# Patient Record
Sex: MALE | Race: WHITE | NOT HISPANIC OR LATINO | Employment: OTHER | URBAN - METROPOLITAN AREA
[De-identification: names, ages, dates, MRNs, and addresses within clinical notes are randomized per-mention and may not be internally consistent; named-entity substitution may affect disease eponyms.]

---

## 2023-04-18 DIAGNOSIS — R13.12 OROPHARYNGEAL DYSPHAGIA: Primary | ICD-10-CM

## 2023-04-18 NOTE — PROGRESS NOTES
Subjective   Arsen Mtz is a 87 y.o. male who presents for No chief complaint on file..  HPI    Objective   There were no vitals taken for this visit.   Physical Exam    Assessment/Plan   Problem List Items Addressed This Visit    None           Phillip Barone MD

## 2023-05-02 DIAGNOSIS — E78.5 DYSLIPIDEMIA: Primary | ICD-10-CM

## 2023-05-02 DIAGNOSIS — I10 PRIMARY HYPERTENSION: Primary | ICD-10-CM

## 2023-05-02 DIAGNOSIS — E03.9 HYPOTHYROIDISM, UNSPECIFIED TYPE: ICD-10-CM

## 2023-05-02 RX ORDER — LOSARTAN POTASSIUM AND HYDROCHLOROTHIAZIDE 25; 100 MG/1; MG/1
TABLET ORAL
Qty: 90 TABLET | Refills: 3 | Status: SHIPPED | OUTPATIENT
Start: 2023-05-02

## 2023-05-04 RX ORDER — LEVOTHYROXINE SODIUM 88 UG/1
TABLET ORAL
Qty: 90 TABLET | Refills: 3 | Status: SHIPPED | OUTPATIENT
Start: 2023-05-04 | End: 2023-05-19 | Stop reason: SDUPTHER

## 2023-05-04 RX ORDER — ATORVASTATIN CALCIUM 20 MG/1
TABLET, FILM COATED ORAL
Qty: 90 TABLET | Refills: 3 | Status: SHIPPED | OUTPATIENT
Start: 2023-05-04 | End: 2024-05-30 | Stop reason: SDUPTHER

## 2023-05-19 DIAGNOSIS — E03.9 HYPOTHYROIDISM, UNSPECIFIED TYPE: ICD-10-CM

## 2023-05-19 RX ORDER — LEVOTHYROXINE SODIUM 88 UG/1
88 TABLET ORAL DAILY
Qty: 90 TABLET | Refills: 3 | Status: SHIPPED | OUTPATIENT
Start: 2023-05-19 | End: 2024-02-05 | Stop reason: SDUPTHER

## 2023-06-12 ENCOUNTER — OFFICE VISIT (OUTPATIENT)
Dept: PRIMARY CARE | Facility: CLINIC | Age: 88
End: 2023-06-12
Payer: MEDICARE

## 2023-06-12 VITALS
DIASTOLIC BLOOD PRESSURE: 60 MMHG | SYSTOLIC BLOOD PRESSURE: 110 MMHG | TEMPERATURE: 97.1 F | OXYGEN SATURATION: 98 % | HEART RATE: 50 BPM | WEIGHT: 128 LBS | HEIGHT: 66 IN | BODY MASS INDEX: 20.57 KG/M2

## 2023-06-12 DIAGNOSIS — F02.B0 MODERATE LATE ONSET ALZHEIMER'S DEMENTIA WITHOUT BEHAVIORAL DISTURBANCE, PSYCHOTIC DISTURBANCE, MOOD DISTURBANCE, OR ANXIETY (MULTI): ICD-10-CM

## 2023-06-12 DIAGNOSIS — R13.12 OROPHARYNGEAL DYSPHAGIA: ICD-10-CM

## 2023-06-12 DIAGNOSIS — R54 FRAIL ELDERLY: ICD-10-CM

## 2023-06-12 DIAGNOSIS — E03.9 HYPOTHYROIDISM, UNSPECIFIED TYPE: ICD-10-CM

## 2023-06-12 DIAGNOSIS — E55.9 VITAMIN D DEFICIENCY: ICD-10-CM

## 2023-06-12 DIAGNOSIS — G30.1 MODERATE LATE ONSET ALZHEIMER'S DEMENTIA WITHOUT BEHAVIORAL DISTURBANCE, PSYCHOTIC DISTURBANCE, MOOD DISTURBANCE, OR ANXIETY (MULTI): ICD-10-CM

## 2023-06-12 DIAGNOSIS — I10 ESSENTIAL HYPERTENSION, BENIGN: ICD-10-CM

## 2023-06-12 DIAGNOSIS — Z00.00 ROUTINE GENERAL MEDICAL EXAMINATION AT HEALTH CARE FACILITY: Primary | ICD-10-CM

## 2023-06-12 PROBLEM — F03.90 DEMENTIA (MULTI): Status: ACTIVE | Noted: 2023-06-12

## 2023-06-12 PROBLEM — H91.90 HEARING LOSS: Status: ACTIVE | Noted: 2023-06-12

## 2023-06-12 PROBLEM — U07.1 COVID-19: Status: RESOLVED | Noted: 2023-06-12 | Resolved: 2023-06-12

## 2023-06-12 PROBLEM — J45.909 REACTIVE AIRWAY DISEASE (HHS-HCC): Status: ACTIVE | Noted: 2023-06-12

## 2023-06-12 PROBLEM — G31.84 AMNESTIC MCI (MILD COGNITIVE IMPAIRMENT WITH MEMORY LOSS): Status: RESOLVED | Noted: 2023-06-12 | Resolved: 2023-06-12

## 2023-06-12 PROBLEM — R26.81 UNSTABLE GAIT: Status: ACTIVE | Noted: 2023-06-12

## 2023-06-12 LAB
ALANINE AMINOTRANSFERASE (SGPT) (U/L) IN SER/PLAS: 7 U/L (ref 10–52)
ALBUMIN (G/DL) IN SER/PLAS: 4.5 G/DL (ref 3.4–5)
ALKALINE PHOSPHATASE (U/L) IN SER/PLAS: 44 U/L (ref 33–136)
ANION GAP IN SER/PLAS: 14 MMOL/L (ref 10–20)
ASPARTATE AMINOTRANSFERASE (SGOT) (U/L) IN SER/PLAS: 16 U/L (ref 9–39)
BILIRUBIN TOTAL (MG/DL) IN SER/PLAS: 0.6 MG/DL (ref 0–1.2)
CALCIDIOL (25 OH VITAMIN D3) (NG/ML) IN SER/PLAS: 55 NG/ML
CALCIUM (MG/DL) IN SER/PLAS: 10.3 MG/DL (ref 8.6–10.6)
CARBON DIOXIDE, TOTAL (MMOL/L) IN SER/PLAS: 31 MMOL/L (ref 21–32)
CHLORIDE (MMOL/L) IN SER/PLAS: 101 MMOL/L (ref 98–107)
CHOLESTEROL (MG/DL) IN SER/PLAS: 138 MG/DL (ref 0–199)
CHOLESTEROL IN HDL (MG/DL) IN SER/PLAS: 58.9 MG/DL
CHOLESTEROL/HDL RATIO: 2.3
COBALAMIN (VITAMIN B12) (PG/ML) IN SER/PLAS: 322 PG/ML (ref 211–911)
CREATININE (MG/DL) IN SER/PLAS: 1.09 MG/DL (ref 0.5–1.3)
ERYTHROCYTE DISTRIBUTION WIDTH (RATIO) BY AUTOMATED COUNT: 13.3 % (ref 11.5–14.5)
ERYTHROCYTE MEAN CORPUSCULAR HEMOGLOBIN CONCENTRATION (G/DL) BY AUTOMATED: 32.5 G/DL (ref 32–36)
ERYTHROCYTE MEAN CORPUSCULAR VOLUME (FL) BY AUTOMATED COUNT: 99 FL (ref 80–100)
ERYTHROCYTES (10*6/UL) IN BLOOD BY AUTOMATED COUNT: 4 X10E12/L (ref 4.5–5.9)
GFR MALE: 65 ML/MIN/1.73M2
GLUCOSE (MG/DL) IN SER/PLAS: 89 MG/DL (ref 74–99)
HEMATOCRIT (%) IN BLOOD BY AUTOMATED COUNT: 39.7 % (ref 41–52)
HEMOGLOBIN (G/DL) IN BLOOD: 12.9 G/DL (ref 13.5–17.5)
LDL: 69 MG/DL (ref 0–99)
LEUKOCYTES (10*3/UL) IN BLOOD BY AUTOMATED COUNT: 6.7 X10E9/L (ref 4.4–11.3)
NRBC (PER 100 WBCS) BY AUTOMATED COUNT: 0 /100 WBC (ref 0–0)
PLATELETS (10*3/UL) IN BLOOD AUTOMATED COUNT: 206 X10E9/L (ref 150–450)
POTASSIUM (MMOL/L) IN SER/PLAS: 4.7 MMOL/L (ref 3.5–5.3)
PROTEIN TOTAL: 7.4 G/DL (ref 6.4–8.2)
SODIUM (MMOL/L) IN SER/PLAS: 141 MMOL/L (ref 136–145)
THYROTROPIN (MIU/L) IN SER/PLAS BY DETECTION LIMIT <= 0.05 MIU/L: 5.86 MIU/L (ref 0.44–3.98)
THYROXINE (T4) FREE (NG/DL) IN SER/PLAS: 1.14 NG/DL (ref 0.78–1.48)
TRIGLYCERIDE (MG/DL) IN SER/PLAS: 51 MG/DL (ref 0–149)
UREA NITROGEN (MG/DL) IN SER/PLAS: 20 MG/DL (ref 6–23)
VLDL: 10 MG/DL (ref 0–40)

## 2023-06-12 PROCEDURE — G0439 PPPS, SUBSEQ VISIT: HCPCS | Performed by: INTERNAL MEDICINE

## 2023-06-12 PROCEDURE — 99397 PER PM REEVAL EST PAT 65+ YR: CPT | Performed by: INTERNAL MEDICINE

## 2023-06-12 PROCEDURE — 82607 VITAMIN B-12: CPT

## 2023-06-12 PROCEDURE — 85027 COMPLETE CBC AUTOMATED: CPT

## 2023-06-12 PROCEDURE — 1160F RVW MEDS BY RX/DR IN RCRD: CPT | Performed by: INTERNAL MEDICINE

## 2023-06-12 PROCEDURE — 1159F MED LIST DOCD IN RCRD: CPT | Performed by: INTERNAL MEDICINE

## 2023-06-12 PROCEDURE — 80061 LIPID PANEL: CPT

## 2023-06-12 PROCEDURE — 1170F FXNL STATUS ASSESSED: CPT | Performed by: INTERNAL MEDICINE

## 2023-06-12 PROCEDURE — 84443 ASSAY THYROID STIM HORMONE: CPT

## 2023-06-12 PROCEDURE — 84439 ASSAY OF FREE THYROXINE: CPT

## 2023-06-12 PROCEDURE — 82306 VITAMIN D 25 HYDROXY: CPT

## 2023-06-12 PROCEDURE — 1036F TOBACCO NON-USER: CPT | Performed by: INTERNAL MEDICINE

## 2023-06-12 PROCEDURE — 3078F DIAST BP <80 MM HG: CPT | Performed by: INTERNAL MEDICINE

## 2023-06-12 PROCEDURE — 99214 OFFICE O/P EST MOD 30 MIN: CPT | Performed by: INTERNAL MEDICINE

## 2023-06-12 PROCEDURE — G0444 DEPRESSION SCREEN ANNUAL: HCPCS | Performed by: INTERNAL MEDICINE

## 2023-06-12 PROCEDURE — 3074F SYST BP LT 130 MM HG: CPT | Performed by: INTERNAL MEDICINE

## 2023-06-12 PROCEDURE — 80053 COMPREHEN METABOLIC PANEL: CPT

## 2023-06-12 RX ORDER — MV-MN/C/THEANINE/HERB NO.310 1000-200MG
2 POWDER IN PACKET (EA) ORAL DAILY
COMMUNITY
Start: 2021-05-19

## 2023-06-12 RX ORDER — NAPROXEN SODIUM 220 MG/1
1 TABLET, FILM COATED ORAL DAILY
COMMUNITY
Start: 2021-05-25

## 2023-06-12 RX ORDER — ACETAMINOPHEN 500 MG
TABLET ORAL
COMMUNITY

## 2023-06-12 RX ORDER — DOCUSATE SODIUM 100 MG/1
100 CAPSULE, LIQUID FILLED ORAL DAILY
COMMUNITY
Start: 2019-05-23

## 2023-06-12 RX ORDER — ALBUTEROL SULFATE 90 UG/1
2 AEROSOL, METERED RESPIRATORY (INHALATION) EVERY 4 HOURS PRN
COMMUNITY
Start: 2019-03-12

## 2023-06-12 ASSESSMENT — ENCOUNTER SYMPTOMS
DEPRESSION: 0
LOSS OF SENSATION IN FEET: 0
OCCASIONAL FEELINGS OF UNSTEADINESS: 1

## 2023-06-12 ASSESSMENT — ACTIVITIES OF DAILY LIVING (ADL)
DRESSING: INDEPENDENT
BATHING: INDEPENDENT
TAKING_MEDICATION: TOTAL CARE
GROCERY_SHOPPING: TOTAL CARE
MANAGING_FINANCES: TOTAL CARE
DOING_HOUSEWORK: TOTAL CARE

## 2023-06-12 ASSESSMENT — PATIENT HEALTH QUESTIONNAIRE - PHQ9
1. LITTLE INTEREST OR PLEASURE IN DOING THINGS: NOT AT ALL
2. FEELING DOWN, DEPRESSED OR HOPELESS: NOT AT ALL
SUM OF ALL RESPONSES TO PHQ9 QUESTIONS 1 AND 2: 0

## 2023-06-12 NOTE — PROGRESS NOTES
"Subjective   Arsen Mtz is a 87 y.o. male who presents for No chief complaint on file..  HPI  Medical history significant for hypertension, hypothyroidism, and BPH.      He has had no recurrence of symptoms, no recent falls.   He likes to walk and exercise in the water. [place] 2 hours per day and lap swimming, 5-6 days per week.  Lifeguards, staff aware of situation.     See visit 3/2/2021 regarding progressive memory changes.   He feels it is \"bad; wife feels fairly stable, waxing and waning. Previously referred to cognitive neurology/psychiatry and he was evaluated on 5/19/2021.   Findings consistent with fairly advanced dementia as suspected. He had an MRI 6/8/2021 but cancelled follow-up. Discussed starting on medications.  He has stopped driving at recommendation of provider.  She feels he cannot be left alone any longer. Gets anxious but she has learned how to help him calm down and answers questions. Tries to limit information she gives him  so he does not keep asking about it.  Discussed need for planning, especially if she is unable or unavailable to care for him even temporarily and certainly long-term plans.     See prior visit 10/10/2019, barium swallow showed aspiration, small Zencker's, speech eval also completed. Showed some osteophytes also affecting oropharynx.  Had repeat on 3/28/2023.  \"Recommendations: No diet modification can be  recommended that will entirely prevent aspiration at this time.  However, given that pt does not appear to have had respiratory  changes recently, recommend to continue a PO diet of soft/bite-sized  solids and thin liquids AS TOLERATED with aspiration precautions:  Small bites/sips, add moisture to solids, one bite/sips at a time,  chew thoroughly, 2-3 swallows per bolus, prophylactic cough and dry  swallow every 5-6 boluses. If pt develops PNA or other signs of  respiratory decline, will need to consider NPO with alternative  source of " "nutrition, if appropriate within goals of care\"    Occ dulcolax when needed.  Daily stool softener.     Hx of urinary retention, has been following with urology and status post HoLEP on 05/23/19 doing well. No symptoms at all with urination.     See visit 3/2/2021 regarding cough history. Resolved without recurrence.      Mild hypothyroidism on 88 Âµg levothyroxine, stable since 2018.     Having some worsening tremor, no functional issues. Has been experiencing intermittent instability with walking, thinks it is related to the weather, no falls or dizziness. Also intermittent tremor of the right hand especially when  writing.   Mood has been good, fairly stable.     Originally from [facility], is , no children, they live in [place]. 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 Now mainly water exercises. Drinks one small serving of Grazyna daily, remote light smoker for about 20 years, quit at age 40.  Objective   /70   Pulse 50   Temp 36.2 °C (97.1 °F)   Ht 1.676 m (5' 6\")   Wt 58.1 kg (128 lb)   SpO2 98%   BMI 20.66 kg/m²    Physical Exam  Gen: NAD, pleasant, A&Ox2  HEENT: PERRL, EOMI, MMM, OP clear, no lymphadenopathy  Neck: supple, no thyromegaly, no JVD, normal carotid upstroke  Pulm: lungs CTAB, good air movement  CV: RRR, no m/r/g, 2+ DP pulses  Abd: NABS, soft, NT, ND no HSM  Ext: no peripheral edema  Neuro: CN II-XII intact, no focal sensory or motor deficits, normal reflexes, improved stability and gait, slightly decreased arm swing; normal finger to nose, no palmar drift, handwriting is okay, no micrographia, neg Romberg  Assessment/Plan      Inferior MI: In the setting of fall, COVID-19; see visit from 5/25/2021  Unclear diagnosis/risk, given his progressive memory loss and possibility that vascular disease can be related, we decided to try to optimize risk of progression and/or recurrence of ASCVD event.  Since his cholesterol " levels have been near optimal without therapy, started atorvastatin 20 mg daily along with daily aspirin. Given bradycardia and fall risk, I did not restart his metoprolol.     Progressive memory loss, suspect Alzheimer's dementia: Until March 2021 he has declined referral; with notable progression, he agreed and was evaluated by Dr. Crump, MRI completed, cancelled follow-up?; not interested at this time, does not want medications; agreed to follow-up again      Falls, unsteady gait: Referred to PT, did not complete, doing better overall       Dysphagia: had GI evaluation, barium swallow, speech eval; improved symptoms with dietary/behavioral; had repeat with SLP evaluation and updated recommendations; little to be done for further optimization      Chronic cough/wheeze: Resolved, see note from 3/2/2021 for details of work-up and evaluation if recurs; using inhaler again with weather changes, can use q4-6 hours (similar timing as previous seasonally); if using w/increased frequency, can restart ICS     Hypertension: good control, continue losartan and hydrochlorothiazide 100-25 mg; will check with home BP cuff and bring in to calibrate     Hypothyroidism: Continue levothyroxine 88 µg daily     BPH: Asymptomatic since procedure, follow-up urology prn    NSHL: has hearing aids, hasn't used     Health maintenance  -Smoking history: Remote  -Counseled regarding diet and exercise  -Immunizations: recommended Shingrix and annual influenza  -Followup in 3-6 months  Problem List Items Addressed This Visit    None           Phillip Barone MD

## 2023-08-08 ENCOUNTER — OFFICE VISIT (OUTPATIENT)
Dept: PRIMARY CARE | Facility: CLINIC | Age: 88
End: 2023-08-08
Payer: MEDICARE

## 2023-08-08 VITALS
HEART RATE: 51 BPM | TEMPERATURE: 97.3 F | SYSTOLIC BLOOD PRESSURE: 133 MMHG | BODY MASS INDEX: 20.73 KG/M2 | DIASTOLIC BLOOD PRESSURE: 62 MMHG | HEIGHT: 66 IN | WEIGHT: 129 LBS

## 2023-08-08 DIAGNOSIS — R13.12 OROPHARYNGEAL DYSPHAGIA: Primary | ICD-10-CM

## 2023-08-08 DIAGNOSIS — F02.B0 MODERATE LATE ONSET ALZHEIMER'S DEMENTIA WITHOUT BEHAVIORAL DISTURBANCE, PSYCHOTIC DISTURBANCE, MOOD DISTURBANCE, OR ANXIETY (MULTI): ICD-10-CM

## 2023-08-08 DIAGNOSIS — G30.1 MODERATE LATE ONSET ALZHEIMER'S DEMENTIA WITHOUT BEHAVIORAL DISTURBANCE, PSYCHOTIC DISTURBANCE, MOOD DISTURBANCE, OR ANXIETY (MULTI): ICD-10-CM

## 2023-08-08 PROCEDURE — 1159F MED LIST DOCD IN RCRD: CPT | Performed by: INTERNAL MEDICINE

## 2023-08-08 PROCEDURE — 1126F AMNT PAIN NOTED NONE PRSNT: CPT | Performed by: INTERNAL MEDICINE

## 2023-08-08 PROCEDURE — 3075F SYST BP GE 130 - 139MM HG: CPT | Performed by: INTERNAL MEDICINE

## 2023-08-08 PROCEDURE — 1036F TOBACCO NON-USER: CPT | Performed by: INTERNAL MEDICINE

## 2023-08-08 PROCEDURE — 1160F RVW MEDS BY RX/DR IN RCRD: CPT | Performed by: INTERNAL MEDICINE

## 2023-08-08 PROCEDURE — 3078F DIAST BP <80 MM HG: CPT | Performed by: INTERNAL MEDICINE

## 2023-08-08 PROCEDURE — 99213 OFFICE O/P EST LOW 20 MIN: CPT | Performed by: INTERNAL MEDICINE

## 2023-08-08 NOTE — PROGRESS NOTES
"Subjective   Arsen Mtz is a 87 y.o. male who presents for Memory Loss and dysphagia.  HPI  Mostly here with wife to reinforce and go over recommendations from SLP for dysphagia.    Memory is an issue as he notices the same issues anew and forgets about prior testing and recommendations.  Asks several time if there is a pill or special drink that he can take.  Reinforced and repeated.  Wife jokes that she deals with this multiple times per day.  She thinks he is doing fairly well.  Mostly a trouble especially when he has a more difficult day and causes more anxiety.  Objective   /62   Pulse 51   Temp 36.3 °C (97.3 °F)   Ht 1.676 m (5' 6\")   Wt 58.5 kg (129 lb)   BMI 20.82 kg/m²      Clear OP, no masses or lesions  MMM    Assessment/Plan   Problem List Items Addressed This Visit       Oropharyngeal dysphagia - Primary            Phillip Barone MD   "

## 2023-11-15 ENCOUNTER — OFFICE VISIT (OUTPATIENT)
Dept: PRIMARY CARE | Facility: CLINIC | Age: 88
End: 2023-11-15
Payer: MEDICARE

## 2023-11-15 VITALS
OXYGEN SATURATION: 96 % | HEART RATE: 52 BPM | SYSTOLIC BLOOD PRESSURE: 149 MMHG | TEMPERATURE: 97.3 F | WEIGHT: 129 LBS | DIASTOLIC BLOOD PRESSURE: 69 MMHG | BODY MASS INDEX: 20.82 KG/M2

## 2023-11-15 DIAGNOSIS — E03.8 OTHER SPECIFIED HYPOTHYROIDISM: Primary | ICD-10-CM

## 2023-11-15 LAB — TSH SERPL-ACNC: 3.17 MIU/L (ref 0.44–3.98)

## 2023-11-15 PROCEDURE — 36415 COLL VENOUS BLD VENIPUNCTURE: CPT

## 2023-11-15 PROCEDURE — 1126F AMNT PAIN NOTED NONE PRSNT: CPT | Performed by: INTERNAL MEDICINE

## 2023-11-15 PROCEDURE — 3077F SYST BP >= 140 MM HG: CPT | Performed by: INTERNAL MEDICINE

## 2023-11-15 PROCEDURE — 1159F MED LIST DOCD IN RCRD: CPT | Performed by: INTERNAL MEDICINE

## 2023-11-15 PROCEDURE — 1036F TOBACCO NON-USER: CPT | Performed by: INTERNAL MEDICINE

## 2023-11-15 PROCEDURE — 3078F DIAST BP <80 MM HG: CPT | Performed by: INTERNAL MEDICINE

## 2023-11-15 PROCEDURE — 84443 ASSAY THYROID STIM HORMONE: CPT

## 2023-11-15 PROCEDURE — 1160F RVW MEDS BY RX/DR IN RCRD: CPT | Performed by: INTERNAL MEDICINE

## 2023-11-15 PROCEDURE — 99214 OFFICE O/P EST MOD 30 MIN: CPT | Performed by: INTERNAL MEDICINE

## 2023-11-15 ASSESSMENT — ENCOUNTER SYMPTOMS
SHORTNESS OF BREATH: 0
ABDOMINAL PAIN: 0
APPETITE CHANGE: 0
CHILLS: 0
HEADACHES: 0
ACTIVITY CHANGE: 0

## 2023-11-15 NOTE — PROGRESS NOTES
"Subjective   Arsen Mtz is a 88 y.o. male who presents for Follow-up.  HPI  Medical history significant for hypertension, hypothyroidism, and BPH.     He presents today with his wife. He states his problems are \"forgetting things\" and \"I can't swallow\" throughout the visit after he was asked how his swallowing is. He previously had a swallow study and saw SLP a while ago. His wife frequently has to remind him at home to take small sips throughout eating.     He enjoys being active and swims or walks daily at the [employer]. He has not had any falls. His wife manages his medications. His levothyroxine was uptitrated based on previous labs. His wife confirms he is taking the increased dose of an extra half pill per week.      Objective   /69   Pulse 52   Temp 36.3 °C (97.3 °F)   Wt 58.5 kg (129 lb)   SpO2 96%   BMI 20.82 kg/m²      Review of Systems   Constitutional:  Negative for activity change, appetite change and chills.   Respiratory:  Negative for shortness of breath.    Cardiovascular:  Negative for chest pain.   Gastrointestinal:  Negative for abdominal pain.   Neurological:  Negative for headaches.      Physical Exam  Gen: NAD, pleasant, A&Ox2  HEENT: PERRL, EOMI, MMM, OP clear, no lymphadenopathy  Neck: supple, no thyromegaly, no JVD, normal carotid upstroke, no goiter  Pulm: lungs CTAB, good air movement, no wheezing  CV: RRR, no m/r/g, 2+ DP pulses  Abd: NABS, soft, NT, ND no HSM  Ext: no peripheral edema  Neuro: CN II-XII intact grossly intact, impaired memory, frequently repeats statements he had made earlier in the visit  Assessment/Plan   88 year old male accompanied by his wife for a 6 month follow up visit.      Hx of Inferior MI: In the setting of fall, COVID-19; see visit from 5/25/2021  Unclear diagnosis/risk, given his progressive memory loss and possibility that vascular disease can be related, we decided to try to optimize risk of progression and/or recurrence of ASCVD " event.  Since his cholesterol levels have been near optimal without therapy, started atorvastatin 20 mg daily along with daily aspirin at last visit, continue. Given bradycardia and fall risk, I did not restart his metoprolol at last visit.     Progressive memory loss, suspect Alzheimer's dementia: Until March 2021 he has declined referral; with notable progression, he agreed and was evaluated by Dr. Crump, MRI completed, but did not follow up, not interested in medications     Hx falls, unsteady gait: Referred to PT, did not complete, doing better overall. He has had no falls since last visit and does not use any assistive devices.      Dysphagia: had GI evaluation, barium swallow, speech eval; improved symptoms with dietary/behavioral; had repeat with SLP evaluation and updated recommendations; little to be done for further optimization     Hypertension: good control, continue losartan and hydrochlorothiazide 100-25 mg; will check with home BP cuff and bring in to calibrate     Hypothyroidism: Continue levothyroxine 88 µg daily 7.5 pills weekly. Recheck TSH in office today.      BPH: Asymptomatic since procedure, follow-up urology prn    NSHL: has hearing aids, hasn't used     Health maintenance  -Smoking history: Remote  -Counseled regarding diet and exercise  -Immunizations: recommended Shingrix and annual influenza  -Follow up in 3 months   Problem List Items Addressed This Visit    None           Mitali Sherman MD

## 2023-11-17 NOTE — PROGRESS NOTES
I saw and evaluated the patient. I personally obtained the key and critical portions of the history and physical exam or was physically present for key and critical portions performed by the resident/fellow. I reviewed the resident/fellow's documentation and discussed the patient with the resident/fellow. I agree with the resident/fellow's medical decision making as documented in the note.    Phillip Barone MD

## 2024-02-05 DIAGNOSIS — E03.9 HYPOTHYROIDISM, UNSPECIFIED TYPE: ICD-10-CM

## 2024-02-05 RX ORDER — LEVOTHYROXINE SODIUM 88 UG/1
88 TABLET ORAL DAILY
Qty: 90 TABLET | Refills: 3 | Status: SHIPPED | OUTPATIENT
Start: 2024-02-05

## 2024-02-27 ENCOUNTER — OFFICE VISIT (OUTPATIENT)
Dept: PRIMARY CARE | Facility: CLINIC | Age: 89
End: 2024-02-27
Payer: MEDICARE

## 2024-02-27 VITALS
HEIGHT: 66 IN | OXYGEN SATURATION: 96 % | DIASTOLIC BLOOD PRESSURE: 68 MMHG | HEART RATE: 56 BPM | SYSTOLIC BLOOD PRESSURE: 115 MMHG | TEMPERATURE: 97.9 F | WEIGHT: 131 LBS | BODY MASS INDEX: 21.05 KG/M2

## 2024-02-27 DIAGNOSIS — F02.B0 MODERATE LATE ONSET ALZHEIMER'S DEMENTIA WITHOUT BEHAVIORAL DISTURBANCE, PSYCHOTIC DISTURBANCE, MOOD DISTURBANCE, OR ANXIETY (MULTI): Primary | ICD-10-CM

## 2024-02-27 DIAGNOSIS — R13.12 OROPHARYNGEAL DYSPHAGIA: ICD-10-CM

## 2024-02-27 DIAGNOSIS — G30.1 MODERATE LATE ONSET ALZHEIMER'S DEMENTIA WITHOUT BEHAVIORAL DISTURBANCE, PSYCHOTIC DISTURBANCE, MOOD DISTURBANCE, OR ANXIETY (MULTI): Primary | ICD-10-CM

## 2024-02-27 DIAGNOSIS — E03.9 HYPOTHYROIDISM, UNSPECIFIED TYPE: ICD-10-CM

## 2024-02-27 DIAGNOSIS — I10 ESSENTIAL HYPERTENSION, BENIGN: ICD-10-CM

## 2024-02-27 PROCEDURE — 1157F ADVNC CARE PLAN IN RCRD: CPT | Performed by: INTERNAL MEDICINE

## 2024-02-27 PROCEDURE — 1158F ADVNC CARE PLAN TLK DOCD: CPT | Performed by: INTERNAL MEDICINE

## 2024-02-27 PROCEDURE — 3074F SYST BP LT 130 MM HG: CPT | Performed by: INTERNAL MEDICINE

## 2024-02-27 PROCEDURE — 1126F AMNT PAIN NOTED NONE PRSNT: CPT | Performed by: INTERNAL MEDICINE

## 2024-02-27 PROCEDURE — 3078F DIAST BP <80 MM HG: CPT | Performed by: INTERNAL MEDICINE

## 2024-02-27 PROCEDURE — 99214 OFFICE O/P EST MOD 30 MIN: CPT | Performed by: INTERNAL MEDICINE

## 2024-02-27 PROCEDURE — 1123F ACP DISCUSS/DSCN MKR DOCD: CPT | Performed by: INTERNAL MEDICINE

## 2024-02-27 PROCEDURE — 1036F TOBACCO NON-USER: CPT | Performed by: INTERNAL MEDICINE

## 2024-02-27 NOTE — PROGRESS NOTES
"Subjective   Arsen Mtz is a 88 y.o. male who presents for No chief complaint on file..  HPI  Medical history significant for hypertension, hypothyroidism, and BPH.     Ongoing memory and swallowing complaints, see visit from JUN 2023.    No changes, no falls.  Denies any dizziness or lightheadedness.    Originally from [facility], is , no children, they live in [place]. 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 Now mainly water exercises; will at least exercise at home daily. Drinks one small serving of Grazyna on occasion, remote light smoker for about 20 years, quit at age 40.      Objective   /59   Pulse 50   Temp 36.6 °C (97.9 °F)   Ht 1.676 m (5' 6\")   Wt 59.4 kg (131 lb)   SpO2 96%   BMI 21.14 kg/m²      Review of Systems   Physical Exam  Gen: NAD, pleasant, A&Ox2  HEENT: PERRL, EOMI, MMM, OP clear, no lymphadenopathy  Neck: supple, no thyromegaly, no JVD, normal carotid upstroke, no goiter  Pulm: lungs CTAB, good air movement, no wheezing  CV: RRR, no m/r/g, 2+ DP pulses  Abd: NABS, soft, NT, ND no HSM  Ext: no peripheral edema  Neuro: CN II-XII intact grossly intact, impaired memory, frequently repeats statements he had made earlier in the visit  Assessment/Plan      Hx of Inferior MI: In the setting of fall, COVID-19; see visit from 5/25/2021  Unclear diagnosis/risk, given his progressive memory loss and possibility that vascular disease can be related, we decided to try to optimize risk of progression and/or recurrence of ASCVD event.  Since his cholesterol levels have been near optimal without therapy, started atorvastatin 20 mg daily along with daily aspirin at last visit, continue. Given bradycardia and fall risk, I did not restart his metoprolol at last visit.     Progressive memory loss, suspect Alzheimer's dementia: Until March 2021 he has declined referral; with notable progression, he agreed and was evaluated by  " Theron, MRI completed, but did not follow up, not interested in medications     Hx falls, unsteady gait: Referred to PT, did not complete, doing better overall. He has had no falls since last visit and does not use any assistive devices.      Dysphagia: had GI evaluation, barium swallow, speech eval; improved symptoms with dietary/behavioral; had repeat with SLP evaluation and updated recommendations; little to be done for further optimization, see visit from JUN 2023 for summary.     Hypertension: good control, continue losartan and hydrochlorothiazide 100-25 mg; will check with home BP cuff and bring in to calibrate     Hypothyroidism: Continue levothyroxine 88 µg daily 7.5 pills weekly. Recheck TSH normal 11/15/2023     BPH: Asymptomatic since procedure, follow-up urology prn    NSHL: has hearing aids, hasn't used     Health maintenance  -Smoking history: Remote  -Counseled regarding diet and exercise  -Immunizations: recommended Shingrix and annual influenza  -Follow up in 3 months   Problem List Items Addressed This Visit    None           Phillip Barone MD

## 2024-04-29 ENCOUNTER — HOSPITAL ENCOUNTER (OUTPATIENT)
Dept: RADIOLOGY | Facility: EXTERNAL LOCATION | Age: 89
Discharge: HOME | End: 2024-04-29
Payer: MEDICARE

## 2024-04-29 DIAGNOSIS — R09.89 CHEST CONGESTION: ICD-10-CM

## 2024-05-28 ENCOUNTER — OFFICE VISIT (OUTPATIENT)
Dept: PRIMARY CARE | Facility: CLINIC | Age: 89
End: 2024-05-28
Payer: MEDICARE

## 2024-05-28 VITALS
TEMPERATURE: 97.9 F | WEIGHT: 129 LBS | OXYGEN SATURATION: 95 % | HEART RATE: 53 BPM | BODY MASS INDEX: 20.82 KG/M2 | SYSTOLIC BLOOD PRESSURE: 138 MMHG | DIASTOLIC BLOOD PRESSURE: 70 MMHG

## 2024-05-28 DIAGNOSIS — E03.9 HYPOTHYROIDISM, UNSPECIFIED TYPE: ICD-10-CM

## 2024-05-28 DIAGNOSIS — F02.B0 MODERATE LATE ONSET ALZHEIMER'S DEMENTIA WITHOUT BEHAVIORAL DISTURBANCE, PSYCHOTIC DISTURBANCE, MOOD DISTURBANCE, OR ANXIETY (MULTI): ICD-10-CM

## 2024-05-28 DIAGNOSIS — R54 FRAIL ELDERLY: ICD-10-CM

## 2024-05-28 DIAGNOSIS — R26.81 UNSTABLE GAIT: ICD-10-CM

## 2024-05-28 DIAGNOSIS — I10 ESSENTIAL HYPERTENSION, BENIGN: ICD-10-CM

## 2024-05-28 DIAGNOSIS — G30.1 MODERATE LATE ONSET ALZHEIMER'S DEMENTIA WITHOUT BEHAVIORAL DISTURBANCE, PSYCHOTIC DISTURBANCE, MOOD DISTURBANCE, OR ANXIETY (MULTI): ICD-10-CM

## 2024-05-28 DIAGNOSIS — Z00.00 ROUTINE GENERAL MEDICAL EXAMINATION AT HEALTH CARE FACILITY: Primary | ICD-10-CM

## 2024-05-28 LAB
ALBUMIN SERPL BCP-MCNC: 4.4 G/DL (ref 3.4–5)
ALP SERPL-CCNC: 49 U/L (ref 33–136)
ALT SERPL W P-5'-P-CCNC: 10 U/L (ref 10–52)
ANION GAP SERPL CALC-SCNC: 16 MMOL/L (ref 10–20)
AST SERPL W P-5'-P-CCNC: 18 U/L (ref 9–39)
BILIRUB SERPL-MCNC: 0.6 MG/DL (ref 0–1.2)
BUN SERPL-MCNC: 22 MG/DL (ref 6–23)
CALCIUM SERPL-MCNC: 10.3 MG/DL (ref 8.6–10.6)
CHLORIDE SERPL-SCNC: 101 MMOL/L (ref 98–107)
CHOLEST SERPL-MCNC: 140 MG/DL (ref 0–199)
CHOLESTEROL/HDL RATIO: 2.6
CO2 SERPL-SCNC: 30 MMOL/L (ref 21–32)
CREAT SERPL-MCNC: 1.06 MG/DL (ref 0.5–1.3)
EGFRCR SERPLBLD CKD-EPI 2021: 68 ML/MIN/1.73M*2
ERYTHROCYTE [DISTWIDTH] IN BLOOD BY AUTOMATED COUNT: 13.6 % (ref 11.5–14.5)
GLUCOSE SERPL-MCNC: 80 MG/DL (ref 74–99)
HCT VFR BLD AUTO: 38.1 % (ref 41–52)
HDLC SERPL-MCNC: 54 MG/DL
HGB BLD-MCNC: 12.4 G/DL (ref 13.5–17.5)
LDLC SERPL CALC-MCNC: 74 MG/DL
MCH RBC QN AUTO: 32.5 PG (ref 26–34)
MCHC RBC AUTO-ENTMCNC: 32.5 G/DL (ref 32–36)
MCV RBC AUTO: 100 FL (ref 80–100)
NON HDL CHOLESTEROL: 86 MG/DL (ref 0–149)
NRBC BLD-RTO: 0 /100 WBCS (ref 0–0)
PLATELET # BLD AUTO: 204 X10*3/UL (ref 150–450)
POTASSIUM SERPL-SCNC: 4 MMOL/L (ref 3.5–5.3)
PROT SERPL-MCNC: 7.7 G/DL (ref 6.4–8.2)
RBC # BLD AUTO: 3.82 X10*6/UL (ref 4.5–5.9)
SODIUM SERPL-SCNC: 143 MMOL/L (ref 136–145)
TRIGL SERPL-MCNC: 62 MG/DL (ref 0–149)
TSH SERPL-ACNC: 5.02 MIU/L (ref 0.44–3.98)
VLDL: 12 MG/DL (ref 0–40)
WBC # BLD AUTO: 6.8 X10*3/UL (ref 4.4–11.3)

## 2024-05-28 PROCEDURE — 1036F TOBACCO NON-USER: CPT | Performed by: INTERNAL MEDICINE

## 2024-05-28 PROCEDURE — 80061 LIPID PANEL: CPT

## 2024-05-28 PROCEDURE — 99397 PER PM REEVAL EST PAT 65+ YR: CPT | Performed by: INTERNAL MEDICINE

## 2024-05-28 PROCEDURE — 3078F DIAST BP <80 MM HG: CPT | Performed by: INTERNAL MEDICINE

## 2024-05-28 PROCEDURE — 84443 ASSAY THYROID STIM HORMONE: CPT

## 2024-05-28 PROCEDURE — 3075F SYST BP GE 130 - 139MM HG: CPT | Performed by: INTERNAL MEDICINE

## 2024-05-28 PROCEDURE — 99214 OFFICE O/P EST MOD 30 MIN: CPT | Performed by: INTERNAL MEDICINE

## 2024-05-28 PROCEDURE — G0444 DEPRESSION SCREEN ANNUAL: HCPCS | Performed by: INTERNAL MEDICINE

## 2024-05-28 PROCEDURE — 1157F ADVNC CARE PLAN IN RCRD: CPT | Performed by: INTERNAL MEDICINE

## 2024-05-28 PROCEDURE — 84439 ASSAY OF FREE THYROXINE: CPT

## 2024-05-28 PROCEDURE — 1160F RVW MEDS BY RX/DR IN RCRD: CPT | Performed by: INTERNAL MEDICINE

## 2024-05-28 PROCEDURE — 1159F MED LIST DOCD IN RCRD: CPT | Performed by: INTERNAL MEDICINE

## 2024-05-28 PROCEDURE — G0439 PPPS, SUBSEQ VISIT: HCPCS | Performed by: INTERNAL MEDICINE

## 2024-05-28 PROCEDURE — 85027 COMPLETE CBC AUTOMATED: CPT

## 2024-05-28 PROCEDURE — 80053 COMPREHEN METABOLIC PANEL: CPT

## 2024-05-28 PROCEDURE — 36415 COLL VENOUS BLD VENIPUNCTURE: CPT

## 2024-05-28 PROCEDURE — 1170F FXNL STATUS ASSESSED: CPT | Performed by: INTERNAL MEDICINE

## 2024-05-28 PROCEDURE — 1123F ACP DISCUSS/DSCN MKR DOCD: CPT | Performed by: INTERNAL MEDICINE

## 2024-05-28 PROCEDURE — 1158F ADVNC CARE PLAN TLK DOCD: CPT | Performed by: INTERNAL MEDICINE

## 2024-05-28 ASSESSMENT — ACTIVITIES OF DAILY LIVING (ADL)
DOING_HOUSEWORK: NEEDS ASSISTANCE
DRESSING: INDEPENDENT
MANAGING_FINANCES: TOTAL CARE
TAKING_MEDICATION: TOTAL CARE
BATHING: INDEPENDENT
GROCERY_SHOPPING: TOTAL CARE

## 2024-05-28 ASSESSMENT — PATIENT HEALTH QUESTIONNAIRE - PHQ9
2. FEELING DOWN, DEPRESSED OR HOPELESS: NOT AT ALL
SUM OF ALL RESPONSES TO PHQ9 QUESTIONS 1 AND 2: 0
1. LITTLE INTEREST OR PLEASURE IN DOING THINGS: NOT AT ALL

## 2024-05-28 ASSESSMENT — ENCOUNTER SYMPTOMS
LOSS OF SENSATION IN FEET: 0
DEPRESSION: 0
OCCASIONAL FEELINGS OF UNSTEADINESS: 0

## 2024-05-28 NOTE — PROGRESS NOTES
Subjective   Arsen Mtz is a 88 y.o. male who presents for Medicare Annual Wellness Visit Subsequent.  HPI  Medical history significant for hypertension, hypothyroidism, and BPH.     Fell on Saturday, no recollection, was able to get up on his own pretty easily.  His shoes were on the floor so wife suspects his shoes weren't on correctly.  Hurt his left side with bruises visible but breathing fine, mild pain with cough.  Overall improving.  Has taken some APAP and topical OTC.  Had bronchitis at the end of April, went to [facility], where he had an xray and was treated with azithro and benzonatate.  Resolved.    Ongoing memory and swallowing complaints, see visit from JUN 2023.    No changes, no falls.  Denies any dizziness or lightheadedness.    Originally from [facility], is , no children, they live in [place]. 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 Now mainly water exercises; will at least exercise at home daily. Drinks one small serving of Grazyna on occasion, remote light smoker for about 20 years, quit at age 40.      Objective   /70   Pulse 53   Temp 36.6 °C (97.9 °F)   Wt 58.5 kg (129 lb)   SpO2 95%   BMI 20.82 kg/m²      Review of Systems   Physical Exam  Gen: NAD, pleasant, A&Ox2  HEENT: PERRL, EOMI, MMM, OP clear, no lymphadenopathy  Neck: supple, no thyromegaly, no JVD, normal carotid upstroke, no goiter  Pulm: lungs CTAB, good air movement, no wheezing  CV: RRR, no m/r/g, 2+ DP pulses  Abd: NABS, soft, NT, ND no HSM  Ext: no peripheral edema  Neuro: CN II-XII intact grossly intact, impaired memory, frequently repeats statements he had made earlier in the visit  MSK: some bruising upper left arm and left rib, no pain or deformity  Assessment/Plan      Hx of Inferior MI: In the setting of a fall and COVID-19; see visit from 5/25/2021  Unclear diagnosis/risk, given his progressive memory loss and possibility that vascular disease  can be related, we decided to try to optimize risk of progression and/or recurrence of ASCVD event.  Since his cholesterol levels have been near optimal without therapy, started atorvastatin 20 mg daily along with daily aspirin at last visit, continue. Given bradycardia and fall risk, I did not restart his metoprolol.     Progressive memory loss, suspect Alzheimer's dementia: Until March 2021 he has declined referral; with notable progression, he agreed and was evaluated by Dr. Crump, MRI completed, but did not follow up, not interested in medications  - recommended fall alert bracelet/necklace  - recommended ID tag as he does wander occasionally     Hx falls, unsteady gait: Referred to PT, did not complete, doing better overall. He has had one fall since last visit and does not use any assistive devices.      Dysphagia: had GI evaluation, barium swallow, speech eval; improved symptoms with dietary/behavioral; had repeat with SLP evaluation and updated recommendations; little to be done for further optimization, see visit from JUN 2023 for summary.     Hypertension: adequate control, continue losartan and hydrochlorothiazide 100-25 mg;      Hypothyroidism: Continue levothyroxine 88 µg daily 7.5 pills weekly. Recheck TSH normal 11/15/2023     BPH: Asymptomatic since procedure, follow-up urology prn    NSHL: has hearing aids, hasn't used     Health maintenance  -Smoking history: Remote  -Counseled regarding diet and exercise  -Immunizations: recommended Shingrix and annual influenza, consider RSV  -Follow up in 3 months   Problem List Items Addressed This Visit    None  Visit Diagnoses       Routine general medical examination at health care facility    -  Primary                 Phillip Barone MD

## 2024-05-29 LAB — T4 FREE SERPL-MCNC: 1.28 NG/DL (ref 0.78–1.48)

## 2024-05-30 DIAGNOSIS — E78.5 DYSLIPIDEMIA: ICD-10-CM

## 2024-05-30 RX ORDER — ATORVASTATIN CALCIUM 20 MG/1
20 TABLET, FILM COATED ORAL NIGHTLY
Qty: 90 TABLET | Refills: 3 | Status: SHIPPED | OUTPATIENT
Start: 2024-05-30

## 2024-06-11 DIAGNOSIS — I10 PRIMARY HYPERTENSION: ICD-10-CM

## 2024-06-11 RX ORDER — LOSARTAN POTASSIUM AND HYDROCHLOROTHIAZIDE 25; 100 MG/1; MG/1
TABLET ORAL
Qty: 90 TABLET | Refills: 3 | Status: SHIPPED | OUTPATIENT
Start: 2024-06-11 | End: 2024-06-14

## 2024-06-14 RX ORDER — LOSARTAN POTASSIUM AND HYDROCHLOROTHIAZIDE 25; 100 MG/1; MG/1
1 TABLET ORAL DAILY
Qty: 90 TABLET | Refills: 3 | Status: SHIPPED | OUTPATIENT
Start: 2024-06-14 | End: 2025-06-14

## 2024-06-26 ENCOUNTER — TELEPHONE (OUTPATIENT)
Dept: PRIMARY CARE | Facility: CLINIC | Age: 89
End: 2024-06-26
Payer: MEDICARE

## 2024-06-26 NOTE — TELEPHONE ENCOUNTER
Patients wife called and said they needed his blood type. I did try and call patient and let them know we do not have a blood type on file. But I got a busy tone

## 2024-07-08 ENCOUNTER — HOSPITAL ENCOUNTER (OUTPATIENT)
Dept: RADIOLOGY | Facility: EXTERNAL LOCATION | Age: 89
Discharge: HOME | End: 2024-07-08
Payer: MEDICARE

## 2024-07-08 DIAGNOSIS — R06.02 SOB (SHORTNESS OF BREATH): ICD-10-CM

## 2024-08-28 ENCOUNTER — APPOINTMENT (OUTPATIENT)
Dept: PRIMARY CARE | Facility: CLINIC | Age: 89
End: 2024-08-28
Payer: MEDICARE

## 2024-10-28 ENCOUNTER — APPOINTMENT (OUTPATIENT)
Dept: PRIMARY CARE | Facility: CLINIC | Age: 89
End: 2024-10-28
Payer: MEDICARE

## 2024-10-28 VITALS
BODY MASS INDEX: 20.18 KG/M2 | DIASTOLIC BLOOD PRESSURE: 76 MMHG | WEIGHT: 125 LBS | OXYGEN SATURATION: 98 % | TEMPERATURE: 97.3 F | HEART RATE: 50 BPM | SYSTOLIC BLOOD PRESSURE: 157 MMHG

## 2024-10-28 DIAGNOSIS — G30.1 MODERATE LATE ONSET ALZHEIMER'S DEMENTIA WITHOUT BEHAVIORAL DISTURBANCE, PSYCHOTIC DISTURBANCE, MOOD DISTURBANCE, OR ANXIETY (MULTI): ICD-10-CM

## 2024-10-28 DIAGNOSIS — E03.9 HYPOTHYROIDISM, UNSPECIFIED TYPE: ICD-10-CM

## 2024-10-28 DIAGNOSIS — H90.3 SENSORINEURAL HEARING LOSS (SNHL) OF BOTH EARS: Primary | ICD-10-CM

## 2024-10-28 DIAGNOSIS — J45.909 REACTIVE AIRWAY DISEASE WITHOUT COMPLICATION, UNSPECIFIED ASTHMA SEVERITY, UNSPECIFIED WHETHER PERSISTENT (HHS-HCC): ICD-10-CM

## 2024-10-28 DIAGNOSIS — F02.B0 MODERATE LATE ONSET ALZHEIMER'S DEMENTIA WITHOUT BEHAVIORAL DISTURBANCE, PSYCHOTIC DISTURBANCE, MOOD DISTURBANCE, OR ANXIETY (MULTI): ICD-10-CM

## 2024-10-28 DIAGNOSIS — I10 ESSENTIAL HYPERTENSION, BENIGN: ICD-10-CM

## 2024-10-28 PROCEDURE — 3078F DIAST BP <80 MM HG: CPT | Performed by: INTERNAL MEDICINE

## 2024-10-28 PROCEDURE — G2211 COMPLEX E/M VISIT ADD ON: HCPCS | Performed by: INTERNAL MEDICINE

## 2024-10-28 PROCEDURE — 3077F SYST BP >= 140 MM HG: CPT | Performed by: INTERNAL MEDICINE

## 2024-10-28 PROCEDURE — 99214 OFFICE O/P EST MOD 30 MIN: CPT | Performed by: INTERNAL MEDICINE

## 2024-10-28 PROCEDURE — 1159F MED LIST DOCD IN RCRD: CPT | Performed by: INTERNAL MEDICINE

## 2024-10-28 PROCEDURE — 1123F ACP DISCUSS/DSCN MKR DOCD: CPT | Performed by: INTERNAL MEDICINE

## 2024-10-28 PROCEDURE — 1036F TOBACCO NON-USER: CPT | Performed by: INTERNAL MEDICINE

## 2024-10-28 PROCEDURE — 1157F ADVNC CARE PLAN IN RCRD: CPT | Performed by: INTERNAL MEDICINE

## 2024-10-28 ASSESSMENT — ENCOUNTER SYMPTOMS
LOSS OF SENSATION IN FEET: 0
OCCASIONAL FEELINGS OF UNSTEADINESS: 0
DEPRESSION: 0

## 2024-11-14 ENCOUNTER — APPOINTMENT (OUTPATIENT)
Dept: OTOLARYNGOLOGY | Facility: CLINIC | Age: 89
End: 2024-11-14
Payer: MEDICARE

## 2024-11-14 VITALS — WEIGHT: 122 LBS | BODY MASS INDEX: 19.69 KG/M2 | TEMPERATURE: 96.9 F

## 2024-11-14 DIAGNOSIS — H91.93 HEARING DIFFICULTY OF BOTH EARS: Primary | ICD-10-CM

## 2024-11-14 DIAGNOSIS — H61.21 IMPACTED CERUMEN OF RIGHT EAR: ICD-10-CM

## 2024-11-14 PROCEDURE — 1160F RVW MEDS BY RX/DR IN RCRD: CPT | Performed by: NURSE PRACTITIONER

## 2024-11-14 PROCEDURE — 1036F TOBACCO NON-USER: CPT | Performed by: NURSE PRACTITIONER

## 2024-11-14 PROCEDURE — 1123F ACP DISCUSS/DSCN MKR DOCD: CPT | Performed by: NURSE PRACTITIONER

## 2024-11-14 PROCEDURE — 1159F MED LIST DOCD IN RCRD: CPT | Performed by: NURSE PRACTITIONER

## 2024-11-14 PROCEDURE — 1157F ADVNC CARE PLAN IN RCRD: CPT | Performed by: NURSE PRACTITIONER

## 2024-11-14 PROCEDURE — 99203 OFFICE O/P NEW LOW 30 MIN: CPT | Performed by: NURSE PRACTITIONER

## 2024-11-14 ASSESSMENT — PATIENT HEALTH QUESTIONNAIRE - PHQ9
2. FEELING DOWN, DEPRESSED OR HOPELESS: NOT AT ALL
1. LITTLE INTEREST OR PLEASURE IN DOING THINGS: NOT AT ALL
SUM OF ALL RESPONSES TO PHQ9 QUESTIONS 1 AND 2: 0

## 2024-11-14 NOTE — PROGRESS NOTES
Subjective   Patient ID: Arsen Mtz is a 89 y.o. male who presents for Cerumen Impaction (bilateral).  HPI  This patient is referred for evaluation of possible cerumen impactions.  The patient is  accompanied by his wife.   When asked about ear pain, hearing loss, itching, discharge from ear, tinnitus, aural fullness or autophony, the patient admits to none.  His wife expresses concern about hearing difficulty.  The patient does not wear a hearing aid.      Review of Systems  A comprehensive or 10 points review of the patient's constitutional, neurological, HEENT, pulmonary, cardiovascular and genito-urinary systems showed only those mentioned in history of present illness.    Objective   Physical Exam  Constitutional: no fever, chills, weight loss or weight gain   General appearance: Appears well, well-nourished, well groomed. No acute distress.   Communication: Normal communication   Psychiatric: Oriented to person, place and time. Normal mood and affect.   Neurologic: Cranial nerves II-XII grossly intact and symmetric bilaterally.   Head and Face:   Head: Atraumatic with no masses, lesions or scarring.   Face: Normal symmetry, no paralysis, synkinesis or facial tic. No scars or deformities.     Eyes: Conjunctiva not edematous or erythematous   Ears: External inspection of ears with no deformity, scars or masses.  Right canal with cerumen impaction.  Left canal clear.  TM intact.  No effusion or retraction noted.     Neck: Normal appearing, symmetric, trachea midline.   Cardiovascular: Examination of peripheral vascular system shows no clubbing or cyanosis.   Respiratory: No respiratory distress increased work of breathing. Inspection of the chest with symmetric chest expansion and normal respiratory effort.   Skin: No rashes in the head or neck    Assessment/Plan     This patient presents for initial evaluation of acute acquired right-sided cerumen impaction and bilateral hearing difficulty.    Reassurance  given that otologic exam is normal after cleaning.  He may follow-up as needed.  All questions were answered to patient and family's satisfaction.    This note was created using speech recognition transcription software. Despite proofreading, several typographical errors might be present that might affect the meaning of the content. Please call with any questions.  Patient ID: Arsen Mtz is a 89 y.o. male.    Ear cerumen removal    Date/Time: 11/14/2024 3:23 PM    Performed by: SUSAN Peña  Authorized by: SUSAN Peña    Consent:     Consent obtained:  Verbal    Consent given by:  Patient    Risks discussed:  Pain    Alternatives discussed:  No treatment  Procedure details:     Location:  R ear    Procedure type comment:  Suction    Procedure outcomes: cerumen removed    Post-procedure details:     Inspection:  No bleeding, ear canal clear and TM intact    Hearing quality:  Improved    Procedure completion:  Tolerated well, no immediate complications             SUSAN Peña 11/14/24 3:21 PM

## 2024-12-10 DIAGNOSIS — E03.9 HYPOTHYROIDISM, UNSPECIFIED TYPE: ICD-10-CM

## 2024-12-10 RX ORDER — LEVOTHYROXINE SODIUM 88 UG/1
88 TABLET ORAL DAILY
Qty: 90 TABLET | Refills: 3 | Status: SHIPPED | OUTPATIENT
Start: 2024-12-10

## 2025-01-13 DIAGNOSIS — E03.9 HYPOTHYROIDISM, UNSPECIFIED TYPE: ICD-10-CM

## 2025-01-13 RX ORDER — LEVOTHYROXINE SODIUM 88 UG/1
88 TABLET ORAL DAILY
Qty: 90 TABLET | Refills: 2 | Status: SHIPPED | OUTPATIENT
Start: 2025-01-13

## 2025-03-13 ENCOUNTER — OFFICE VISIT (OUTPATIENT)
Dept: URGENT CARE | Age: OVER 89
End: 2025-03-13
Payer: MEDICARE

## 2025-03-13 VITALS
TEMPERATURE: 98.1 F | DIASTOLIC BLOOD PRESSURE: 78 MMHG | RESPIRATION RATE: 12 BRPM | SYSTOLIC BLOOD PRESSURE: 132 MMHG | WEIGHT: 125 LBS | OXYGEN SATURATION: 98 % | BODY MASS INDEX: 20.18 KG/M2 | HEART RATE: 63 BPM

## 2025-03-13 DIAGNOSIS — R06.2 WHEEZING: ICD-10-CM

## 2025-03-13 DIAGNOSIS — J45.901 MILD ASTHMA WITH EXACERBATION, UNSPECIFIED WHETHER PERSISTENT (HHS-HCC): Primary | ICD-10-CM

## 2025-03-13 RX ORDER — PREDNISONE 20 MG/1
40 TABLET ORAL DAILY
Qty: 10 TABLET | Refills: 0 | Status: SHIPPED | OUTPATIENT
Start: 2025-03-13 | End: 2025-03-18

## 2025-03-13 RX ORDER — IPRATROPIUM BROMIDE AND ALBUTEROL SULFATE 2.5; .5 MG/3ML; MG/3ML
3 SOLUTION RESPIRATORY (INHALATION) ONCE
Status: COMPLETED | OUTPATIENT
Start: 2025-03-13 | End: 2025-03-13

## 2025-03-13 RX ORDER — PREDNISONE 20 MG/1
40 TABLET ORAL DAILY
Qty: 10 TABLET | Refills: 0 | Status: SHIPPED
Start: 2025-03-13 | End: 2025-03-13

## 2025-03-13 RX ADMIN — IPRATROPIUM BROMIDE AND ALBUTEROL SULFATE 3 ML: 2.5; .5 SOLUTION RESPIRATORY (INHALATION) at 10:32

## 2025-03-13 ASSESSMENT — ENCOUNTER SYMPTOMS
SINUS PRESSURE: 0
CHILLS: 0
SINUS PAIN: 0
SORE THROAT: 0
RHINORRHEA: 0
COUGH: 1
ABDOMINAL PAIN: 0
WHEEZING: 1
DIARRHEA: 0
SHORTNESS OF BREATH: 0
DEPRESSION: 0
DIZZINESS: 0
WEAKNESS: 0
ACTIVITY CHANGE: 0
MYALGIAS: 0
VOMITING: 0
FEVER: 0
NAUSEA: 0
HEADACHES: 0
OCCASIONAL FEELINGS OF UNSTEADINESS: 1
FATIGUE: 0
LOSS OF SENSATION IN FEET: 0

## 2025-03-13 NOTE — PROGRESS NOTES
Subjective   Patient ID: Arsen Mtz is a 89 y.o. male. They present today with a chief complaint of Wheezing.    History of Present Illness  This is a very pleasant 89-year-old male medical history of, but limited to, asthma, and Alzheimer's who presents to the clinic today brought in by wife for evaluation of cough/wheezing.  Wife states she gave him a treatment every morning for the last 3 days.  States he had improved after each 1.  She states that he had a look on his face this morning like it did not help as much as it normally does therefore she wanted him to be seen here.  She states this has happened to him in the past.  No other symptoms.  No change in mental status.  Patient eating and drinking normally.      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    Benign neoplasm of colon 06/30/2005    COVID-19 06/12/2023    Personal history of other diseases of urinary system     History of acute pyelonephritis    ST elevation (STEMI) myocardial infarction involving other coronary artery of inferior wall (Multi) 03/07/2022    Myocardial infarction, inferior wall, initial care       Past Surgical History:   Procedure Laterality Date    APPENDECTOMY  07/01/2016    Appendectomy    CATARACT EXTRACTION  07/01/2016    Cataract Surgery    HERNIA REPAIR  08/03/2018    Inguinal Hernia Repair    PROSTATE SURGERY  07/01/2016     Prostate Surgery        reports that he has never smoked. He has never used smokeless tobacco. He reports current alcohol use of about 6.0 standard drinks of alcohol per week. He reports that he does not use drugs.    Review of Systems  Review of Systems   Constitutional:  Negative for activity change, chills, fatigue and fever.   HENT:  Negative for congestion, ear pain, rhinorrhea, sinus pressure, sinus pain and sore throat.    Eyes:  Negative for visual disturbance.   Respiratory:  Positive for cough and wheezing. Negative for shortness of breath.    Cardiovascular:  Negative for chest pain and leg swelling.   Gastrointestinal:  Negative for abdominal pain, diarrhea, nausea and vomiting.   Musculoskeletal:  Negative for myalgias.   Skin:  Negative for rash.   Neurological:  Negative for dizziness, weakness and headaches.   All other systems reviewed and are negative.                                 Objective    Vitals:    03/13/25 0943   BP: 132/78   Pulse: 63   Resp: 12   Temp: 36.7 °C (98.1 °F)   SpO2: 98%   Weight: 56.7 kg (125 lb)     No LMP for male patient.    Physical Exam  Vitals reviewed.   Constitutional:       General: He is not in acute distress.     Appearance: Normal appearance. He is normal weight. He is not ill-appearing or toxic-appearing.   HENT:      Head: Normocephalic and atraumatic.      Right Ear: Tympanic membrane, ear canal and external ear normal.      Left Ear: Tympanic membrane, ear canal and external ear normal.      Nose: Nose normal.      Mouth/Throat:      Mouth: Mucous membranes are moist.      Pharynx: No oropharyngeal exudate or posterior oropharyngeal erythema.   Eyes:      Extraocular Movements: Extraocular movements intact.      Conjunctiva/sclera: Conjunctivae normal.      Pupils: Pupils are equal, round, and reactive to light.   Cardiovascular:      Rate and Rhythm: Normal rate and regular rhythm.      Pulses: Normal pulses.      Heart sounds: Normal heart sounds.    Pulmonary:      Effort: Pulmonary effort is normal.      Breath sounds: Decreased air movement present. Decreased breath sounds present. No wheezing.   Musculoskeletal:         General: Normal range of motion.      Cervical back: Normal range of motion and neck supple.      Right lower leg: No edema.      Left lower leg: No edema.   Skin:     General: Skin is warm and dry.      Capillary Refill: Capillary refill takes less than 2 seconds.   Neurological:      General: No focal deficit present.      Mental Status: He is alert and oriented to person, place, and time.   Psychiatric:         Mood and Affect: Mood normal.         Behavior: Behavior normal.         Procedures    Point of Care Test & Imaging Results from this visit  No results found for this visit on 03/13/25.   No results found.    Diagnostic study results (if any) were reviewed by Kristin L Schoenlein, APRN-CNP.    Assessment/Plan   Allergies, medications, history, and pertinent labs/EKGs/Imaging reviewed by Kristin L Schoenlein, APRN-CNP.     Medical Decision Making  VSS, NAD, Nontoxic appearing.  Lungs with diminished air movement throughout.  DuoNeb administered.    Wife states they do not have a nebulizer they just have his Ventolin inhaler that she has with her.    Lungs CTA status post treatment.  Patient does not appear in any acute distress with increased work of breathing or nasal flaring or cyanosis.    Symptoms, history, and exam are consistent with: Asthma exacerbation.  Imaging is not needed at this time.  Will place patient on prednisone burst and order a spacer to help administer albuterol.  Strict ED precautions reviewed.    Differential Dx include, however, are not limited to: Reactive airway, pneumonia, bronchitis    I have a low suspicion for any acute pathologies requiring emergent evaluation and further workup at this time.  I believe patient is safe to discharge home with a low threshold for emergency room as discussed during  visit.  We discussed close follow-up with primary care provider/pediatrician. Supportive care discussed.  Medication(s) profile of OTC and Rxed medication(s) if prescribed was (were) reviewed.  All questions answered and addressed.  Patient verbalized understanding.      Orders and Diagnoses  Diagnoses and all orders for this visit:  Mild asthma with exacerbation, unspecified whether persistent ([facility])  -     inhalational spacing device inhaler; Use as instructed  -     predniSONE (Deltasone) 20 mg tablet; Take 2 tablets (40 mg) by mouth once daily for 5 days.  Wheezing  -     ipratropium-albuteroL (Duo-Neb) 0.5-2.5 mg/3 mL nebulizer solution 3 mL      Medical Admin Record  Administrations This Visit       ipratropium-albuteroL (Duo-Neb) 0.5-2.5 mg/3 mL nebulizer solution 3 mL       Admin Date  03/13/2025 Action  Given Dose  3 mL Route  nebulization Documented By  Monica Garcai MA                    Patient disposition: Home    Electronically signed by Kristin L Schoenlein, APRN-CNP  10:50 AM

## 2025-03-13 NOTE — PATIENT INSTRUCTIONS
Thank you for letting me care for you today.  You have been seen today for asthma exacerbation.  Please follow up with your primary care provider/pediatrician as directed.   If one is needed, please call [phone].  Please seek care in emergency room for red flags as discussed during visit.

## 2025-04-09 ENCOUNTER — OFFICE VISIT (OUTPATIENT)
Dept: URGENT CARE | Age: OVER 89
End: 2025-04-09
Payer: MEDICARE

## 2025-04-09 VITALS
BODY MASS INDEX: 20.01 KG/M2 | OXYGEN SATURATION: 97 % | RESPIRATION RATE: 16 BRPM | TEMPERATURE: 98 F | HEART RATE: 57 BPM | DIASTOLIC BLOOD PRESSURE: 71 MMHG | SYSTOLIC BLOOD PRESSURE: 181 MMHG | WEIGHT: 124 LBS

## 2025-04-09 DIAGNOSIS — J02.9 SORE THROAT: Primary | ICD-10-CM

## 2025-04-09 ASSESSMENT — PATIENT HEALTH QUESTIONNAIRE - PHQ9
1. LITTLE INTEREST OR PLEASURE IN DOING THINGS: NOT AT ALL
SUM OF ALL RESPONSES TO PHQ9 QUESTIONS 1 AND 2: 0
2. FEELING DOWN, DEPRESSED OR HOPELESS: NOT AT ALL

## 2025-04-09 ASSESSMENT — ENCOUNTER SYMPTOMS: SORE THROAT: 1

## 2025-04-09 NOTE — PROGRESS NOTES
Subjective   Patient ID: Arsen Mtz is a 89 y.o. male. They present today with a chief complaint of Throat Pain (Pt was eating Pretzel ).    History of Present Illness  Patient is an 89-year-old male with history of dementia who presents urgent care today with his wife for complaint of scratchy throat.  His wife, who takes care of him states he was eating a pretzel earlier this evening and complained of a scratchy throat after that.  She provided him with some hot tea which he drank without difficulty.  No other complaints or concerns mention at the time.      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    Benign neoplasm of colon 06/30/2005    COVID-19 06/12/2023    Personal history of other diseases of urinary system     History of acute pyelonephritis    ST elevation (STEMI) myocardial infarction involving other coronary artery of inferior wall (Multi) 03/07/2022    Myocardial infarction, inferior wall, initial care       Past Surgical History:   Procedure Laterality Date    APPENDECTOMY  07/01/2016    Appendectomy    CATARACT EXTRACTION  07/01/2016    Cataract Surgery    HERNIA REPAIR  08/03/2018    Inguinal Hernia Repair    PROSTATE SURGERY  07/01/2016    Prostate Surgery        reports that he has never smoked. He has never used smokeless tobacco. He reports current alcohol use of about 6.0 standard drinks of alcohol per week. He reports that he does not use drugs.    Review of Systems  Review of Systems   HENT:  Positive for sore throat.                                   Objective    Vitals:    04/09/25 1930   BP: (!) 181/71    Pulse: 57   Resp: 16   Temp: 36.7 °C (98 °F)   SpO2: 97%   Weight: 56.2 kg (124 lb)     No LMP for male patient.    Physical Exam  Vitals and nursing note reviewed.   Constitutional:       General: He is not in acute distress.     Appearance: Normal appearance. He is not ill-appearing, toxic-appearing or diaphoretic.   HENT:      Head: Normocephalic and atraumatic.      Mouth/Throat:      Lips: Pink. No lesions.      Mouth: Mucous membranes are moist. No lacerations.      Tongue: No lesions. Tongue does not deviate from midline.      Palate: Lesions present. No mass.      Pharynx: Oropharynx is clear. Uvula midline. No pharyngeal swelling, oropharyngeal exudate, posterior oropharyngeal erythema or uvula swelling.      Tonsils: No tonsillar exudate or tonsillar abscesses.   Eyes:      Extraocular Movements: Extraocular movements intact.      Conjunctiva/sclera: Conjunctivae normal.      Pupils: Pupils are equal, round, and reactive to light.   Cardiovascular:      Rate and Rhythm: Normal rate and regular rhythm.      Pulses: Normal pulses.      Heart sounds: Normal heart sounds.   Pulmonary:      Effort: Pulmonary effort is normal. No respiratory distress.      Breath sounds: Normal breath sounds. No stridor. No wheezing, rhonchi or rales.   Chest:      Chest wall: No tenderness.   Musculoskeletal:         General: Normal range of motion.      Cervical back: Normal range of motion and neck supple.   Skin:     General: Skin is warm and dry.      Capillary Refill: Capillary refill takes less than 2 seconds.   Neurological:      General: No focal deficit present.      Mental Status: He is alert and oriented to person, place, and time.   Psychiatric:         Mood and Affect: Mood normal.         Behavior: Behavior normal.         Procedures      Assessment/Plan   Allergies, medications, history, and pertinent labs/EKGs/Imaging reviewed by TRISH Ureña-CNP.     Medical Decision Making    Patient is well  appearing, afebrile, non toxic, not hypoxic, and appropriate for outpatient treatment and management at time of evaluation. Patient presents with scratchy throat after eating a pretzel earlier this evening.     Differential includes but not limited to: Throat laceration, throat abrasion, food bolus, other    Physical exam is completely unremarkable.  Oropharynx without signs of trauma, swelling or redness.  Patient is talking in swallowing without difficulty.  He does not remember the incident and states he does not currently have any pain or difficulty.  Suspect minor abrasion of the esophagus without complication.  Recommended continued use of warm tea and ibuprofen if needed.    Counseling: Spoke with the patient and his wife discussed today´s findings, in addition to providing specific details for the plan of care and expected course. They were given the opportunity to ask questions.     Discussed return precautions and importance of follow-up. Advised to follow-up with PCP. I specifically advised to go to the ED for changing or worsening symptoms, new symptoms, complaint specific precautions, and precautions listed on the discharge paperwork.     The plan of care was mutually agreed upon with the patient and his wife. All questions and concerns were answered to the best of my ability with today's information.  Patient was discharged in stable condition.    Dictation software was used in the creation of this note which does not evaluate or correct for typographical, spelling, syntax or grammatical errors.     Orders and Diagnoses  Diagnoses and all orders for this visit:  Sore throat      Medical Admin Record      Follow Up Instructions  No follow-ups on file.    Patient disposition: Home    Electronically signed by SUSAN Ureña  7:50 PM

## 2025-04-09 NOTE — PATIENT INSTRUCTIONS
you were seen at Urgent Care today for a sore throat. Please treat as discussed.  Monitor for red flags which we spoke about, If your symptoms change, worsen or become concerning in any way, please go to the emergency room immediately, otherwise you can followup with your PCP in 2-3 days as needed

## 2025-04-14 DIAGNOSIS — I10 PRIMARY HYPERTENSION: ICD-10-CM

## 2025-04-15 DIAGNOSIS — E78.5 DYSLIPIDEMIA: ICD-10-CM

## 2025-04-15 RX ORDER — ATORVASTATIN CALCIUM 20 MG/1
20 TABLET, FILM COATED ORAL NIGHTLY
Qty: 90 TABLET | Refills: 3 | Status: SHIPPED | OUTPATIENT
Start: 2025-04-15

## 2025-04-15 RX ORDER — LOSARTAN POTASSIUM AND HYDROCHLOROTHIAZIDE 25; 100 MG/1; MG/1
1 TABLET ORAL DAILY
Qty: 90 TABLET | Refills: 3 | Status: SHIPPED | OUTPATIENT
Start: 2025-04-15

## 2025-04-28 ENCOUNTER — APPOINTMENT (OUTPATIENT)
Dept: PRIMARY CARE | Facility: CLINIC | Age: OVER 89
End: 2025-04-28
Payer: MEDICARE

## 2025-04-28 VITALS
BODY MASS INDEX: 19.99 KG/M2 | WEIGHT: 124.4 LBS | DIASTOLIC BLOOD PRESSURE: 82 MMHG | HEIGHT: 66 IN | HEART RATE: 51 BPM | SYSTOLIC BLOOD PRESSURE: 134 MMHG | OXYGEN SATURATION: 98 %

## 2025-04-28 DIAGNOSIS — F02.B0 MODERATE LATE ONSET ALZHEIMER'S DEMENTIA WITHOUT BEHAVIORAL DISTURBANCE, PSYCHOTIC DISTURBANCE, MOOD DISTURBANCE, OR ANXIETY (MULTI): ICD-10-CM

## 2025-04-28 DIAGNOSIS — Z00.00 ROUTINE GENERAL MEDICAL EXAMINATION AT HEALTH CARE FACILITY: Primary | ICD-10-CM

## 2025-04-28 DIAGNOSIS — G30.1 MODERATE LATE ONSET ALZHEIMER'S DEMENTIA WITHOUT BEHAVIORAL DISTURBANCE, PSYCHOTIC DISTURBANCE, MOOD DISTURBANCE, OR ANXIETY (MULTI): ICD-10-CM

## 2025-04-28 DIAGNOSIS — E03.9 HYPOTHYROIDISM, UNSPECIFIED TYPE: ICD-10-CM

## 2025-04-28 DIAGNOSIS — I10 ESSENTIAL HYPERTENSION, BENIGN: ICD-10-CM

## 2025-04-28 PROCEDURE — G0439 PPPS, SUBSEQ VISIT: HCPCS | Performed by: INTERNAL MEDICINE

## 2025-04-28 PROCEDURE — 1159F MED LIST DOCD IN RCRD: CPT | Performed by: INTERNAL MEDICINE

## 2025-04-28 PROCEDURE — 99397 PER PM REEVAL EST PAT 65+ YR: CPT | Performed by: INTERNAL MEDICINE

## 2025-04-28 PROCEDURE — 1157F ADVNC CARE PLAN IN RCRD: CPT | Performed by: INTERNAL MEDICINE

## 2025-04-28 PROCEDURE — 3079F DIAST BP 80-89 MM HG: CPT | Performed by: INTERNAL MEDICINE

## 2025-04-28 PROCEDURE — G8433 SCR FOR DEP NOT CPT DOC RSN: HCPCS | Performed by: INTERNAL MEDICINE

## 2025-04-28 PROCEDURE — 1160F RVW MEDS BY RX/DR IN RCRD: CPT | Performed by: INTERNAL MEDICINE

## 2025-04-28 PROCEDURE — 1123F ACP DISCUSS/DSCN MKR DOCD: CPT | Performed by: INTERNAL MEDICINE

## 2025-04-28 PROCEDURE — 99214 OFFICE O/P EST MOD 30 MIN: CPT | Performed by: INTERNAL MEDICINE

## 2025-04-28 PROCEDURE — 1036F TOBACCO NON-USER: CPT | Performed by: INTERNAL MEDICINE

## 2025-04-28 PROCEDURE — 3075F SYST BP GE 130 - 139MM HG: CPT | Performed by: INTERNAL MEDICINE

## 2025-04-28 PROCEDURE — 1170F FXNL STATUS ASSESSED: CPT | Performed by: INTERNAL MEDICINE

## 2025-04-28 ASSESSMENT — ACTIVITIES OF DAILY LIVING (ADL)
MANAGING_FINANCES: NEEDS ASSISTANCE
BATHING: NEEDS ASSISTANCE
DOING_HOUSEWORK: NEEDS ASSISTANCE
GROCERY_SHOPPING: NEEDS ASSISTANCE
DRESSING: NEEDS ASSISTANCE
TAKING_MEDICATION: NEEDS ASSISTANCE

## 2025-04-28 ASSESSMENT — ENCOUNTER SYMPTOMS
OCCASIONAL FEELINGS OF UNSTEADINESS: 1
DEPRESSION: 0
LOSS OF SENSATION IN FEET: 0

## 2025-04-28 ASSESSMENT — COLUMBIA-SUICIDE SEVERITY RATING SCALE - C-SSRS
6. HAVE YOU EVER DONE ANYTHING, STARTED TO DO ANYTHING, OR PREPARED TO DO ANYTHING TO END YOUR LIFE?: NO
2. HAVE YOU ACTUALLY HAD ANY THOUGHTS OF KILLING YOURSELF?: NO
1. IN THE PAST MONTH, HAVE YOU WISHED YOU WERE DEAD OR WISHED YOU COULD GO TO SLEEP AND NOT WAKE UP?: NO

## 2025-04-28 NOTE — PATIENT INSTRUCTIONS
- Referral to GERIATRICS for assessment and planning (especially if medications needed for agitation).  - Labs ordered, can be done after the visit or sooner depending on scheduling

## 2025-04-28 NOTE — PROGRESS NOTES
"Subjective   Arsen Mtz is a 89 y.o. male who presents for No chief complaint on file..  HPI  Medical history significant for hypertension, hypothyroidism, and BPH.     Interim:  - asthma (?) exacerbation, 3/13/2025, ER, treated with prednisone and breathing treatment    Memory is worse, often does not recognize his wife and for the first time does not recognize me at his visit today.  No safety concerns.  However, can be agitated when confused/delusional, wife handles it by redirecting.    Ongoing memory and swallowing complaints, see visit from JUN 2023.    No changes, no falls.  Denies any dizziness or lightheadedness.    Originally from [facility], is , no children, they live in [place]. 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 Now mainly water exercises; will at least exercise at home daily. Drinks one small serving of Grayzna on occasion, remote light smoker for about 20 years, quit at age 40.      Objective   /82   Pulse 51   Ht 1.676 m (5' 6\")   Wt 56.4 kg (124 lb 6.4 oz)   SpO2 98%   BMI 20.08 kg/m²      Review of Systems   Physical Exam  Gen: NAD, pleasant, A&Ox2  HEENT: PERRL, EOMI, MMM, OP clear, no lymphadenopathy  Neck: supple, no thyromegaly, no JVD, normal carotid upstroke, no goiter  Pulm: lungs CTAB, good air movement, no wheezing  CV: RRR, no m/r/g, 2+ DP pulses  Abd: NABS, soft, NT, ND no HSM  Ext: no peripheral edema  Neuro: CN II-XII intact grossly intact, impaired memory, frequently repeats statements he had made earlier in the visit  MSK: some bruising upper left arm and left rib, no pain or deformity  Assessment/Plan     \"Reactive airway disease\": If possible, I requested that they try to make an appointment with me if there is recurrence so I can reassess, possibly put him on a controller inhaled ICS, possibly get formal PFTs     Hx of Inferior MI: In the setting of a fall and COVID-19; see visit from " 5/25/2021  Unclear diagnosis/risk, given his progressive memory loss and possibility that vascular disease can be related, we decided to try to optimize risk of progression and/or recurrence of ASCVD event.  Since his cholesterol levels have been near optimal without therapy, started atorvastatin 20 mg daily along with daily aspirin, continue. Given bradycardia and fall risk, I did not restart his metoprolol.     Progressive memory loss, suspect Alzheimer's dementia: Until March 2021 he has declined referral; with notable progression, he agreed and was evaluated by Dr. Crump, MRI completed, but did not follow up, not interested in medications  - recommended fall alert bracelet/necklace  - recommended ID tag as he does wander occasionally  - recommend follow-up with Dr. Crump/geriatrics     Hx falls, unsteady gait: Referred to PT, did not complete, doing better overall. He has had one fall since last visit and does not use any assistive devices.      Dysphagia: had GI evaluation, barium swallow, speech eval; improved symptoms with dietary/behavioral; had repeat with SLP evaluation and updated recommendations; little to be done for further optimization, see visit from JUN 2023 for summary.     Hypertension: adequate control, continue losartan and hydrochlorothiazide 100-25 mg;      Hypothyroidism: Continue levothyroxine 88 µg daily 7.5 pills weekly. Recheck TSH close to normal 5/28/2024     BPH: Asymptomatic since procedure, follow-up urology prn    NSHL: has hearing aids, hasn't used, refer back to audiology     Health maintenance  -Smoking history: Remote  -Counseled regarding diet and exercise  -Immunizations: recommended Shingrix and annual influenza, consider RSV  -Follow up in 6 months   Problem List Items Addressed This Visit    None             Phillip Barone MD

## 2025-05-13 ENCOUNTER — APPOINTMENT (OUTPATIENT)
Dept: OPHTHALMOLOGY | Facility: CLINIC | Age: OVER 89
End: 2025-05-13
Payer: MEDICARE

## 2025-05-13 LAB
ALBUMIN SERPL-MCNC: 4.3 G/DL (ref 3.6–5.1)
ALP SERPL-CCNC: 44 U/L (ref 35–144)
ALT SERPL-CCNC: 6 U/L (ref 9–46)
ANION GAP SERPL CALCULATED.4IONS-SCNC: 11 MMOL/L (CALC) (ref 7–17)
AST SERPL-CCNC: 13 U/L (ref 10–35)
BILIRUB SERPL-MCNC: 0.3 MG/DL (ref 0.2–1.2)
BUN SERPL-MCNC: 28 MG/DL (ref 7–25)
CALCIUM SERPL-MCNC: 9.8 MG/DL (ref 8.6–10.3)
CHLORIDE SERPL-SCNC: 108 MMOL/L (ref 98–110)
CO2 SERPL-SCNC: 26 MMOL/L (ref 20–32)
CREAT SERPL-MCNC: 1.06 MG/DL (ref 0.7–1.22)
EGFRCR SERPLBLD CKD-EPI 2021: 67 ML/MIN/1.73M2
FOLATE SERPL-MCNC: 5 NG/ML
GLUCOSE SERPL-MCNC: 119 MG/DL (ref 65–139)
POTASSIUM SERPL-SCNC: 3.9 MMOL/L (ref 3.5–5.3)
PROT SERPL-MCNC: 6.9 G/DL (ref 6.1–8.1)
SODIUM SERPL-SCNC: 145 MMOL/L (ref 135–146)
T4 SERPL-MCNC: 8.3 MCG/DL (ref 4.9–10.5)
TSH SERPL-ACNC: 1.42 MIU/L (ref 0.4–4.5)
VIT B12 SERPL-MCNC: >2000 PG/ML (ref 200–1100)

## 2025-07-21 ENCOUNTER — OFFICE VISIT (OUTPATIENT)
Dept: URGENT CARE | Age: OVER 89
End: 2025-07-21
Payer: MEDICARE

## 2025-07-21 VITALS
RESPIRATION RATE: 20 BRPM | HEART RATE: 78 BPM | TEMPERATURE: 97.7 F | DIASTOLIC BLOOD PRESSURE: 62 MMHG | WEIGHT: 124 LBS | HEIGHT: 66 IN | OXYGEN SATURATION: 97 % | SYSTOLIC BLOOD PRESSURE: 92 MMHG | BODY MASS INDEX: 19.93 KG/M2

## 2025-07-21 DIAGNOSIS — S05.02XA ABRASION OF LEFT CORNEA, INITIAL ENCOUNTER: Primary | ICD-10-CM

## 2025-07-21 RX ORDER — OFLOXACIN 3 MG/ML
1 SOLUTION/ DROPS OPHTHALMIC 4 TIMES DAILY
Qty: 5 ML | Refills: 0 | Status: SHIPPED | OUTPATIENT
Start: 2025-07-21 | End: 2025-07-26

## 2025-07-21 ASSESSMENT — ENCOUNTER SYMPTOMS
PHOTOPHOBIA: 0
MUSCULOSKELETAL NEGATIVE: 1
EYE REDNESS: 1
GASTROINTESTINAL NEGATIVE: 1
EYE PAIN: 1
RESPIRATORY NEGATIVE: 1
EYE DISCHARGE: 0
CONSTITUTIONAL NEGATIVE: 1
EYE ITCHING: 0
CARDIOVASCULAR NEGATIVE: 1

## 2025-07-21 ASSESSMENT — VISUAL ACUITY: OU: 1

## 2025-07-21 NOTE — PROGRESS NOTES
Subjective   Patient ID: Arsen Mtz is a 89 y.o. male. They present today with a chief complaint of Eye Problem (Left eye might have something in it and pt. Is also concerned for a cough. ).    History of Present Illness  89-year-old male who comes in today with a chief complaint of something in his eye.  He presents here with his wife who states that that began yesterday.  He denies any vision changes, pain, but does state that he almost feels like a foreign body sensation that there is something in his eye.  He states that he was rubbing his eye when it began.  His wife also states that he has had an intermittent cough, but believes it is related to allergies outside.  And she would like me to listen to his lung sounds.  Otherwise he denies any fever/chills, chest pain, shortness of breath, abdominal pain, nausea/vomiting/diarrhea.      Eye Problem  Associated symptoms: redness    Associated symptoms: no discharge, no itching and no photophobia        Past Medical History  Allergies as of 07/21/2025 - Reviewed 07/21/2025   Allergen Reaction Noted    Gloves, latex with aloe vera Rash 05/28/2024    Penicillins Rash 01/21/2002    Sulfamethoxazole-trimethoprim Rash 06/12/2023       Prescriptions Prior to Admission[1]     Medical History[2]    Surgical History[3]     reports that he has never smoked. He has never used smokeless tobacco. He reports current alcohol use of about 6.0 standard drinks of alcohol per week. He reports that he does not use drugs.    Review of Systems  Review of Systems   Constitutional: Negative.    HENT: Negative.     Eyes:  Positive for pain and redness. Negative for photophobia, discharge, itching and visual disturbance.   Respiratory: Negative.     Cardiovascular: Negative.    Gastrointestinal: Negative.    Genitourinary: Negative.    Musculoskeletal: Negative.    Skin: Negative.    All other systems reviewed and are negative.                                 Objective    Vitals:     "07/21/25 1926   BP: 92/62   BP Location: Left arm   Patient Position: Sitting   BP Cuff Size: Large adult   Pulse: 78   Resp: 20   Temp: 36.5 °C (97.7 °F)   TempSrc: Oral   SpO2: 97%   Weight: 56.2 kg (124 lb)   Height: 1.676 m (5' 6\")     No LMP for male patient.    Physical Exam  Vitals and nursing note reviewed.   Constitutional:       Appearance: Normal appearance. He is normal weight.   HENT:      Head: Normocephalic and atraumatic.     Eyes:      General: Lids are normal. Lids are everted, no foreign bodies appreciated. Vision grossly intact. Gaze aligned appropriately.      Extraocular Movements: Extraocular movements intact.      Slit lamp exam:     Left eye: Corneal flare present. No corneal ulcer, foreign body, hyphema, hypopyon, anterior chamber bulge, anterior chamber flares or photophobia.        Comments: Patient was noted to have a slight corneal abrasion seen at the 3 o'clock position in the left eye as marked on the diagram     Cardiovascular:      Rate and Rhythm: Normal rate and regular rhythm.      Pulses: Normal pulses.   Pulmonary:      Effort: Pulmonary effort is normal.      Breath sounds: Normal breath sounds.   Abdominal:      General: Abdomen is flat.   Genitourinary:     Comments: No CVA tenderness or pubic pain.    Musculoskeletal:         General: Normal range of motion.     Skin:     General: Skin is warm and dry.     Neurological:      General: No focal deficit present.      Mental Status: He is alert and oriented to person, place, and time.     Psychiatric:         Mood and Affect: Mood normal.         Behavior: Behavior normal.         Procedures    Point of Care Test & Imaging Results from this visit  No results found for this visit on 07/21/25.   Imaging  No results found.    Cardiology, Vascular, and Other Imaging  No other imaging results found for the past 2 days      Diagnostic study results (if any) were reviewed by Arsenio Cortés PA-C.    Assessment/Plan   Allergies, " medications, history, and pertinent labs/EKGs/Imaging reviewed by Arsenio Cortés PA-C.     Medical Decision Making  89-year-old male comes in today with a chief complaint of left eye irritation.  He states that he feels as though there is something in it.  His wife states that he has been complaining about this since yesterday since rubbing his eye.  She also states that he has had an intermittent cough and would like me to listen to his lung sounds to make sure that they are fine.  Lung sounds were clear, equal and bilateral.  I did use tetracaine to numb his eyes and used fluorescein stain to see a corneal abrasion at the 3 o'clock position.  It appeared very mild and shallow.  I will give him a prescription for ofloxacin.  Patient was advised that if he suddenly develops pain in his eye, vision changes or any other acute eye problem, that he is to immediately go to the emergency room for further care and treatment.  Patient is stable for discharge and request to go home.  Discharge instructions were given.    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

## 2025-07-28 ENCOUNTER — OFFICE VISIT (OUTPATIENT)
Dept: URGENT CARE | Age: OVER 89
End: 2025-07-28
Payer: MEDICARE

## 2025-07-28 VITALS
RESPIRATION RATE: 16 BRPM | HEART RATE: 77 BPM | OXYGEN SATURATION: 96 % | TEMPERATURE: 97.7 F | DIASTOLIC BLOOD PRESSURE: 68 MMHG | SYSTOLIC BLOOD PRESSURE: 102 MMHG

## 2025-07-28 DIAGNOSIS — M54.2 NECK PAIN: Primary | ICD-10-CM

## 2025-07-28 DIAGNOSIS — R35.0 URINARY FREQUENCY: ICD-10-CM

## 2025-07-28 LAB
POC APPEARANCE, URINE: CLEAR
POC BILIRUBIN, URINE: NEGATIVE
POC BLOOD, URINE: ABNORMAL
POC COLOR, URINE: YELLOW
POC GLUCOSE, URINE: NEGATIVE MG/DL
POC KETONES, URINE: NEGATIVE MG/DL
POC LEUKOCYTES, URINE: NEGATIVE
POC NITRITE,URINE: NEGATIVE
POC PH, URINE: 6 PH
POC PROTEIN, URINE: ABNORMAL MG/DL
POC SPECIFIC GRAVITY, URINE: >=1.03
POC UROBILINOGEN, URINE: 0.2 EU/DL

## 2025-07-28 PROCEDURE — 81003 URINALYSIS AUTO W/O SCOPE: CPT | Performed by: FAMILY MEDICINE

## 2025-07-28 PROCEDURE — 3074F SYST BP LT 130 MM HG: CPT | Performed by: FAMILY MEDICINE

## 2025-07-28 PROCEDURE — 3078F DIAST BP <80 MM HG: CPT | Performed by: FAMILY MEDICINE

## 2025-07-28 PROCEDURE — 99213 OFFICE O/P EST LOW 20 MIN: CPT | Performed by: FAMILY MEDICINE

## 2025-07-28 PROCEDURE — 1036F TOBACCO NON-USER: CPT | Performed by: FAMILY MEDICINE

## 2025-07-28 PROCEDURE — 1159F MED LIST DOCD IN RCRD: CPT | Performed by: FAMILY MEDICINE

## 2025-07-28 RX ORDER — LOSARTAN POTASSIUM 50 MG/1
TABLET ORAL
COMMUNITY

## 2025-07-28 ASSESSMENT — ENCOUNTER SYMPTOMS
FREQUENCY: 1
COUGH: 1
CHILLS: 0
CHEST TIGHTNESS: 0
SPEECH DIFFICULTY: 0
NECK PAIN: 1
DIAPHORESIS: 0
FOCAL WEAKNESS: 0
WHEEZING: 1
HEADACHES: 1
DIZZINESS: 0
WEAKNESS: 0
FEVER: 0

## 2025-07-28 NOTE — PROGRESS NOTES
Subjective   Patient ID: Arsen Mtz is a 89 y.o. male. They present today with a chief complaint of Headache.    History of Present Illness    History provided by:  Spouse  History limited by:  Dementia  Headache  Pain location:  Occipital  Quality:  Sharp  Timing:  Rare  Chronicity:  New  Context comment:  Woke up from nap  Relieved by:  Nothing  Ineffective treatments:  None tried  Associated symptoms: cough and neck pain    Associated symptoms: no dizziness, no fever, no focal weakness and no weakness    Associated symptoms comment:  Looked gray      Past Medical History  Allergies as of 07/28/2025 - Reviewed 07/28/2025   Allergen Reaction Noted    Gloves, latex with aloe vera Rash 05/28/2024    Penicillins Rash 01/21/2002    Sulfamethoxazole-trimethoprim Rash 06/12/2023       Prescriptions Prior to Admission[1]     Medical History[2]    Surgical History[3]     reports that he has never smoked. He has never used smokeless tobacco. He reports current alcohol use of about 6.0 standard drinks of alcohol per week. He reports that he does not use drugs.    Review of Systems  Review of Systems   Constitutional:  Negative for chills, diaphoresis and fever.   Respiratory:  Positive for cough and wheezing. Negative for chest tightness.    Genitourinary:  Positive for frequency.   Musculoskeletal:  Positive for neck pain.   Neurological:  Positive for headaches. Negative for dizziness, focal weakness, syncope, speech difficulty and weakness.                                  Objective    Vitals:    07/28/25 1716   BP: 102/68   Pulse: 77   Resp: 16   Temp: 36.5 °C (97.7 °F)   SpO2: 96%     No LMP for male patient.    Physical Exam  Vitals and nursing note reviewed.   Constitutional:       General: He is not in acute distress.     Appearance: He is not ill-appearing, toxic-appearing or diaphoretic.   HENT:      Head: Normocephalic and atraumatic.      Right Ear: Tympanic membrane and ear canal normal.      Left Ear:  Tympanic membrane and ear canal normal.      Nose: Nose normal.      Mouth/Throat:      Mouth: Mucous membranes are moist.      Pharynx: Oropharynx is clear.     Eyes:      Extraocular Movements: Extraocular movements intact.      Conjunctiva/sclera: Conjunctivae normal.      Pupils: Pupils are equal, round, and reactive to light.       Cardiovascular:      Rate and Rhythm: Normal rate and regular rhythm.      Heart sounds: Normal heart sounds.   Pulmonary:      Effort: Pulmonary effort is normal.      Breath sounds: Normal breath sounds.     Musculoskeletal:      Cervical back: Neck supple. Deformity present. No spasms or tenderness. Decreased range of motion.     Skin:     General: Skin is warm and dry.      Coloration: Skin is pale.     Neurological:      General: No focal deficit present.      Mental Status: He is confused.     Psychiatric:         Mood and Affect: Mood normal.         Speech: Speech normal.         Behavior: Behavior normal.         Cognition and Memory: Memory is impaired.         Procedures    Point of Care Test & Imaging Results from this visit  Results for orders placed or performed in visit on 07/28/25   POCT UA Automated manually resulted   Result Value Ref Range    POC Color, Urine Yellow Straw, Yellow, Light-Yellow    POC Appearance, Urine Clear Clear    POC Glucose, Urine NEGATIVE NEGATIVE mg/dl    POC Bilirubin, Urine NEGATIVE NEGATIVE    POC Ketones, Urine NEGATIVE NEGATIVE mg/dl    POC Specific Gravity, Urine >=1.030 1.005 - 1.035    POC Blood, Urine SMALL (1+) (A) NEGATIVE    POC PH, Urine 6.0 No Reference Range Established PH    POC Protein, Urine 30 (1+) (A) NEGATIVE mg/dl    POC Urobilinogen, Urine 0.2 0.2, 1.0 EU/DL    Poc Nitrite, Urine NEGATIVE NEGATIVE    POC Leukocytes, Urine NEGATIVE NEGATIVE      Imaging  No results found.    Cardiology, Vascular, and Other Imaging  No other imaging results found for the past 2 days      Diagnostic study results (if any) were reviewed by  Lorri Vang MD.    Assessment/Plan   Allergies, medications, history, and pertinent labs/EKGs/Imaging reviewed by Lorri Vang MD.     Medical Decision Making  Neck pain:?MDM: Pt appears to have a musculoskeletal strain of the neck. No signs of spinal cord involvement, radiculopathy, or infectious etiology on exam.  Most likely related to the patient's cervical degenerative disc disease.  Pt will be treated with acetaminophen, advised to follow up?with physical therapy if symptoms not improving with treatment. RTC if symptoms worsen at any time.   Urinary frequency; MDM: Urinalysis showed evidence of blood and protein.  Urine sent for culture and patient will be treated accordingly.  Advised wife to have patient get labs at his next office visit in a few weeks to include renal panel and CBC.    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

## 2025-07-30 LAB — BACTERIA UR CULT: NORMAL

## 2025-08-29 ENCOUNTER — APPOINTMENT (OUTPATIENT)
Dept: PRIMARY CARE | Facility: CLINIC | Age: OVER 89
End: 2025-08-29
Payer: MEDICARE

## 2025-10-31 ENCOUNTER — APPOINTMENT (OUTPATIENT)
Dept: PRIMARY CARE | Facility: CLINIC | Age: OVER 89
End: 2025-10-31
Payer: MEDICARE